# Patient Record
Sex: FEMALE
[De-identification: names, ages, dates, MRNs, and addresses within clinical notes are randomized per-mention and may not be internally consistent; named-entity substitution may affect disease eponyms.]

---

## 2021-10-28 ENCOUNTER — NURSE TRIAGE (OUTPATIENT)
Dept: OTHER | Facility: CLINIC | Age: 31
End: 2021-10-28

## 2021-10-28 NOTE — TELEPHONE ENCOUNTER
Brief description of triage: Jackson Muñoz had a flu vaccine on 10/26/2021 and is having redness, warmth and painful to touch at injection site. Caller denies fever or any other symptoms at this time. Triage indicates for patient to [facility] advice provided, patient verbalizes understanding; denies any other questions or concerns; instructed to call back for any new or worsening symptoms. This triage is a result of a call to [address] [place] [address]. Please do not respond to the triage nurse through this encounter. Any subsequent communication should be directly with the patient. Reason for Disposition   Influenza (TIV; Injection) injected vaccine reactions    Answer Assessment - Initial Assessment Questions  1. SYMPTOMS: \"What is the main symptom? \" (e.g., redness, swelling, pain)       Redness, swelling, painful and hot to touch injection site    2. ONSET: \"When was the vaccine (shot) given? \" \"How much later did the redness begin? \" (e.g., hours, days ago)       Within hours    3. SEVERITY: \"How bad is it? \"       Baseball sized    4. FEVER: \"Is there a fever? \" If so, ask: \"What is it, how was it measured, and when did it start? \"       98.2    5. IMMUNIZATIONS GIVEN: \"What shots have you recently received? \"      Flu vaccine on 10/26/2021    6. PAST REACTIONS: \"Have you reacted to immunizations before? \" If so, ask: \"What happened? \"      Sore arm    7. OTHER SYMPTOMS: \"Do you have any other symptoms? \"      none    Protocols used: IMMUNIZATION REACTIONS-ADULT-AH